# Patient Record
Sex: FEMALE | URBAN - METROPOLITAN AREA
[De-identification: names, ages, dates, MRNs, and addresses within clinical notes are randomized per-mention and may not be internally consistent; named-entity substitution may affect disease eponyms.]

---

## 2023-09-12 ENCOUNTER — LAB (OUTPATIENT)
Dept: LAB | Facility: CLINIC | Age: 23
End: 2023-09-12
Payer: COMMERCIAL

## 2023-09-12 ENCOUNTER — TELEPHONE (OUTPATIENT)
Dept: FAMILY MEDICINE | Facility: CLINIC | Age: 23
End: 2023-09-12

## 2023-09-12 ENCOUNTER — OFFICE VISIT (OUTPATIENT)
Dept: FAMILY MEDICINE | Facility: CLINIC | Age: 23
End: 2023-09-12
Payer: COMMERCIAL

## 2023-09-12 VITALS
HEIGHT: 66 IN | TEMPERATURE: 98.1 F | HEART RATE: 69 BPM | RESPIRATION RATE: 16 BRPM | OXYGEN SATURATION: 97 % | WEIGHT: 150.2 LBS | BODY MASS INDEX: 24.14 KG/M2 | SYSTOLIC BLOOD PRESSURE: 100 MMHG | DIASTOLIC BLOOD PRESSURE: 66 MMHG

## 2023-09-12 DIAGNOSIS — K76.9 LIVER LESION: ICD-10-CM

## 2023-09-12 DIAGNOSIS — R10.13 ABDOMINAL PAIN, EPIGASTRIC: ICD-10-CM

## 2023-09-12 DIAGNOSIS — R10.13 ABDOMINAL PAIN, EPIGASTRIC: Primary | ICD-10-CM

## 2023-09-12 PROCEDURE — 83013 H PYLORI (C-13) BREATH: CPT | Mod: 90

## 2023-09-12 PROCEDURE — 99203 OFFICE O/P NEW LOW 30 MIN: CPT | Performed by: INTERNAL MEDICINE

## 2023-09-12 RX ORDER — PANTOPRAZOLE SODIUM 40 MG/1
40 TABLET, DELAYED RELEASE ORAL DAILY
Qty: 30 TABLET | Refills: 1 | Status: SHIPPED | OUTPATIENT
Start: 2023-09-12

## 2023-09-12 ASSESSMENT — PAIN SCALES - GENERAL: PAINLEVEL: SEVERE PAIN (7)

## 2023-09-12 NOTE — PROGRESS NOTES
Assessment & Plan     1.  Abdominal pain, epigastric.  Most likely gastritis or peptic ulcer disease.  Recommended breath test for Helicobacter pylori.  We will try to get it done today.  In the meantime start pantoprazole 40 mg a day since she has not tolerated omeprazole past.  If the symptoms persist in spite of this treatment may need upper endoscopy evaluation.  2.  Liver lesion identified on CT scan of 9/5/2023.  I do not believe it is likely the cause of her abdominal symptoms.  It is a incidental finding.  Been further investigated with contrast MRI which is scheduled next week.  Patient advised to follow-up 2.       MED REC REQUIRED  Post Medication Reconciliation Status:       Gabriel Anand MD  [facility] YURY Flanagan is a 23 year old, presenting for the following health issues:  Hospital F/U      9/12/2023     6:50 AM   Additional Questions   Roomed by BESSIE Tran   Accompanied by Self       History of Present Illness       Reason for visit:  Tumor on my liver wanting a refferal for GI  Symptom onset:  3-7 days ago  Symptoms include:  Vomitting sever pain not able to haveva bowel movement  Symptom intensity:  Severe  Symptom progression:  Worsening  Had these symptoms before:  Yes  Has tried/received treatment for these symptoms:  No  What makes it worse:  Eating    She eats 0-1 servings of fruits and vegetables daily.She consumes 3 sweetened beverage(s) daily.She exercises with enough effort to increase her heart rate 20 to 29 minutes per day.  She exercises with enough effort to increase her heart rate 5 days per week.   She is taking medications regularly.     23-year-old young lady with history of PCO, recurrent UTI s/p IUD start experiencing upper abdominal pain associated nausea vomiting but no hematemesis 9 days ago.  She was seen at [facility] ER on 9/5/2023.  CT scan abdomen pelvis negative except for a lesion in the caudate lobe of the liver which appeared to  have increased in size.  For further clarification a contrast MRI was recommended.  She also had lipase, CBC, BMP and LFTs performed.  They were negative and she was discharged home.  She will follow-up with physician at [facility] on 9/7/2023 at which time omeprazole was prescribed which she decided not to take since she did not tolerate it well in the past.  Her symptoms have persisted.  She complains of epigastric pain with intermittent vomiting.  She has been drinking plenty of fluids.  She continues to go to work.  Contrast MRI has been scheduled next week.  She has been little constipated lately.  After taking MiraLAX she went a little bit.    Hospital Follow-up Visit:    Hospital/Nursing Home/IP Rehab Facility:  [facility]  Date of Admission: 09/05/2023  Date of Discharge: 09/05/2023  Reason(s) for Admission: Abdominal pain and vomiting    Was your hospitalization related to COVID-19? No   Problems taking medications regularly:  None  Medication changes since discharge: None  Problems adhering to non-medication therapy:  None    Summary of hospitalization:  CareEverywhere information obtained and reviewed  Diagnostic Tests/Treatments reviewed.  Follow up needed: MRI liver  Other Healthcare Providers Involved in Patient s Care:         None  Update since discharge: fluctuating course.         Plan of care communicated with patient                   Review of Systems   Constitutional, HEENT, cardiovascular, pulmonary, GI, , musculoskeletal, neuro, skin, endocrine and psych systems are negative, except as otherwise noted.      Objective    There were no vitals taken for this visit.  There is no height or weight on file to calculate BMI.  Physical Exam   GENERAL: healthy, alert and no distress  RESP: lungs clear to auscultation - no rales, rhonchi or wheezes  CV: regular rate and rhythm, normal S1 S2, no S3 or S4, no murmur, click or rub, no peripheral edema and peripheral pulses strong  ABDOMEN: bowel  sounds normal, no palpable or pulsatile masses, and epigastric tenderness palpation noted.  No guarding or rigidity.  MS: no gross musculoskeletal defects noted, no edema    Lab performed in the ER on 9/5/2023 reviewed.  She had normal CBC, LFT, BMP and lipase.  CT scan of the abdomen pelvis performed 9/5/2023 results reviewed.

## 2023-09-12 NOTE — TELEPHONE ENCOUNTER
[facility] pharmacy calling to verify Pantoprazole Rx as patient was recently prescribed Omeprazole on 9/7/2023. Inquiring if patient is to continue using Omeprazole in conjunction with Pantoprazole as they are in the same med class.     RN relayed provider OV notes regarding medication adjustment below. Pharmacy verbalized good understanding. No further questions or concerns.        BESSIE Bedolla  [facility] Primary Care Triage

## 2023-09-13 LAB — UREA BREATH TEST QL: NEGATIVE
